# Patient Record
Sex: MALE | Race: WHITE
[De-identification: names, ages, dates, MRNs, and addresses within clinical notes are randomized per-mention and may not be internally consistent; named-entity substitution may affect disease eponyms.]

---

## 2020-09-23 ENCOUNTER — HOSPITAL ENCOUNTER (EMERGENCY)
Dept: HOSPITAL 12 - ER | Age: 50
LOS: 1 days | Discharge: HOME | End: 2020-09-24
Payer: SELF-PAY

## 2020-09-23 VITALS — BODY MASS INDEX: 26.52 KG/M2 | HEIGHT: 68 IN | WEIGHT: 175 LBS

## 2020-09-23 DIAGNOSIS — S02.31XA: Primary | ICD-10-CM
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
## 2020-09-23 DIAGNOSIS — Z59.0: ICD-10-CM
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
## 2020-09-23 DIAGNOSIS — S02.40CA: ICD-10-CM

## 2020-09-23 DIAGNOSIS — F17.200: ICD-10-CM

## 2020-09-23 PROCEDURE — 70486 CT MAXILLOFACIAL W/O DYE: CPT

## 2020-09-23 PROCEDURE — 96372 THER/PROPH/DIAG INJ SC/IM: CPT

## 2020-09-23 PROCEDURE — 93005 ELECTROCARDIOGRAM TRACING: CPT

## 2020-09-23 PROCEDURE — 99284 EMERGENCY DEPT VISIT MOD MDM: CPT

## 2020-09-23 SDOH — ECONOMIC STABILITY - HOUSING INSECURITY: HOMELESSNESS: Z59.0

## 2020-09-23 NOTE — NUR
2 [employer] officers at bedside interviewing patient.

-------------------------------------------------------------------------------

Addendum: 09/24/20 at 0015 by LAINEY

-------------------------------------------------------------------------------

LEMUEL MIRELES#[id number] & MABEL #[id number] - [id number] [place]

## 2020-09-24 VITALS — DIASTOLIC BLOOD PRESSURE: 88 MMHG | SYSTOLIC BLOOD PRESSURE: 147 MMHG
